# Patient Record
Sex: MALE | Race: WHITE | Employment: OTHER | URBAN - METROPOLITAN AREA
[De-identification: names, ages, dates, MRNs, and addresses within clinical notes are randomized per-mention and may not be internally consistent; named-entity substitution may affect disease eponyms.]

---

## 2017-01-06 ENCOUNTER — APPOINTMENT (OUTPATIENT)
Dept: GENERAL RADIOLOGY | Age: 22
End: 2017-01-06
Attending: EMERGENCY MEDICINE
Payer: OTHER GOVERNMENT

## 2017-01-06 ENCOUNTER — HOSPITAL ENCOUNTER (EMERGENCY)
Age: 22
Discharge: HOME | End: 2017-01-06
Attending: EMERGENCY MEDICINE
Payer: OTHER GOVERNMENT

## 2017-01-06 VITALS
SYSTOLIC BLOOD PRESSURE: 146 MMHG | BODY MASS INDEX: 32.51 KG/M2 | DIASTOLIC BLOOD PRESSURE: 89 MMHG | WEIGHT: 240 LBS | HEIGHT: 72 IN | RESPIRATION RATE: 17 BRPM | OXYGEN SATURATION: 100 % | TEMPERATURE: 97.7 F | HEART RATE: 86 BPM

## 2017-01-06 DIAGNOSIS — M25.572 ACUTE LEFT ANKLE PAIN: ICD-10-CM

## 2017-01-06 DIAGNOSIS — S93.402A SEVERE ANKLE SPRAIN, LEFT, INITIAL ENCOUNTER: Primary | ICD-10-CM

## 2017-01-06 PROCEDURE — 77030036558 HC IMMOB ANK AIR STRRP S2SG-A

## 2017-01-06 PROCEDURE — 99283 EMERGENCY DEPT VISIT LOW MDM: CPT

## 2017-01-06 PROCEDURE — 96372 THER/PROPH/DIAG INJ SC/IM: CPT

## 2017-01-06 PROCEDURE — 73610 X-RAY EXAM OF ANKLE: CPT

## 2017-01-06 PROCEDURE — 74011250636 HC RX REV CODE- 250/636: Performed by: EMERGENCY MEDICINE

## 2017-01-06 RX ORDER — IBUPROFEN 800 MG/1
800 TABLET ORAL
Qty: 16 TAB | Refills: 0 | Status: SHIPPED | OUTPATIENT
Start: 2017-01-06 | End: 2017-01-13

## 2017-01-06 RX ORDER — MORPHINE SULFATE 4 MG/ML
4 INJECTION, SOLUTION INTRAMUSCULAR; INTRAVENOUS
Status: COMPLETED | OUTPATIENT
Start: 2017-01-06 | End: 2017-01-06

## 2017-01-06 RX ORDER — TRAMADOL HYDROCHLORIDE 50 MG/1
50 TABLET ORAL
Qty: 16 TAB | Refills: 0 | Status: SHIPPED | OUTPATIENT
Start: 2017-01-06

## 2017-01-06 RX ADMIN — Medication 4 MG: at 18:14

## 2017-01-06 NOTE — ED PROVIDER NOTES
HPI Comments: 5:50PM Janeth Jenkins is a 24year old male with no pertinent medical hx who presents to ED c/o R ankle pan. Pt explains he rolled his left ankle while stepping off of the curb. Denies any other trauma or injury. No further complaints at this time. The history is provided by the patient. History reviewed. No pertinent past medical history. History reviewed. No pertinent past surgical history. History reviewed. No pertinent family history. Social History     Social History    Marital status:      Spouse name: N/A    Number of children: N/A    Years of education: N/A     Occupational History    Not on file. Social History Main Topics    Smoking status: Current Every Day Smoker    Smokeless tobacco: Not on file    Alcohol use Not on file    Drug use: Not on file    Sexual activity: Not on file     Other Topics Concern    Not on file     Social History Narrative    No narrative on file         ALLERGIES: Penicillins    Review of Systems   Constitutional: Negative for fever. HENT: Negative for sore throat. Eyes: Negative for redness and visual disturbance. Respiratory: Negative for shortness of breath and wheezing. Cardiovascular: Negative for chest pain. Gastrointestinal: Negative for abdominal pain and nausea. Endocrine: Negative for polyuria. Genitourinary: Negative for dysuria. Musculoskeletal: Positive for arthralgias. Negative for neck stiffness. Skin: Negative for rash. Neurological: Negative for headaches. All other systems reviewed and are negative. Vitals:    01/06/17 1759   BP: 146/89   Pulse: 86   Resp: 17   Temp: 97.7 °F (36.5 °C)   SpO2: 100%   Weight: 108.9 kg (240 lb)   Height: 6' (1.829 m)            Physical Exam   Constitutional: He is oriented to person, place, and time. He appears well-developed and well-nourished. No distress. HENT:   Head: Normocephalic and atraumatic.    Eyes: Conjunctivae and EOM are normal. Neck: Normal range of motion. Cardiovascular: Intact distal pulses. Capillary refill < 3 seconds   Pulmonary/Chest: Effort normal. No respiratory distress. Musculoskeletal: Normal range of motion. He exhibits tenderness. Left lower extremity:  Negative Valdez test  Good dorsalis pedal pulses  No hip, tib/fib or knee tenderness  Tenderness to distal and superior lateral malleolus with significant swelling   No foot tenderness  Cant bear weight on left foot   Neurological: He is alert and oriented to person, place, and time. He has normal reflexes. No cranial nerve deficit. He exhibits normal muscle tone. Sensation intact     Skin: Skin is warm and dry. He is not diaphoretic. No pallor. Psychiatric: His behavior is normal.   Nursing note and vitals reviewed. MDM  Number of Diagnoses or Management Options  Acute left ankle pain:   Severe ankle sprain, left, initial encounter:   Diagnosis management comments: ddx fracture, dislocation, sprain, contusion, other structural injury    Xray  Analgesia    Aircast, crutches    I have reassessed the patient. I have discussed the workup, results and plan with the patient and patient is in agreement. Patient has no new complaint. Patient will be prescribed motrin, tramadol. Patient was discharge in stable condition. Patient was given outpatient follow up. Patient is to return to emergency department if any new or worsening condition. Amount and/or Complexity of Data Reviewed  Tests in the radiology section of CPT®: ordered and reviewed  Tests in the medicine section of CPT®: ordered and reviewed    Patient Progress  Patient progress: stable    ED Course       Procedures           PROGRESS NOTES  6:01 PM: Sharon Mederos DO arrives to the bedside to evaluate the patient. Answered the patient's questions regarding the treatment plan.       CONSULTATIONS        ED PHYSICIAN ORDERS  Orders Placed This Encounter    APPLY AIRCAST SPLINT     Left ankle Standing Status:   Standing     Number of Occurrences:   1    XR ANKLE LT MIN 3 V     Standing Status:   Standing     Number of Occurrences:   1     Order Specific Question:   Transport     Answer:   Wheelchair [7]     Order Specific Question:   Reason for Exam     Answer:   pain; fall    CRUTCHES WITH INSTRUCTIONS     Standing Status:   Standing     Number of Occurrences:   1    APPLY ICE TO SPECIFIED AREA     Standing Status:   Standing     Number of Occurrences:   1    morphine injection 4 mg    ibuprofen (MOTRIN) 800 mg tablet     Sig: Take 1 Tab by mouth every six (6) hours as needed for Pain for up to 7 days. Take with food     Dispense:  16 Tab     Refill:  0    traMADol (ULTRAM) 50 mg tablet     Sig: Take 1 Tab by mouth every six (6) hours as needed for Pain. Max Daily Amount: 200 mg. Dispense:  16 Tab     Refill:  0         MEDICATIONS ORDERED  Medications   morphine injection 4 mg (4 mg IntraMUSCular Given 1/6/17 1814)         RADIOLOGY INTERPRETATIONS  XR ANKLE LT MIN 3 V    Soft tissue swelling; No fracture or dislocation; results interpreted by Dr. Carol Poe. 6:33PM, 1/6/2017         ED DIAGNOSIS & DISPOSITION INFORMATION  Diagnosis:   1. Severe ankle sprain, left, initial encounter    2. Acute left ankle pain            Disposition: Discharge    Follow-up Information     Follow up With Details Comments [address], [place] Schedule an appointment as soon as possible for a visit in 1 day As needed, If symptoms worsen [address] [place] [id number] [phone]              Discharge Medication List as of 1/6/2017  6:43 PM      START taking these medications    Details   ibuprofen (MOTRIN) 800 mg tablet Take 1 Tab by mouth every six (6) hours as needed for Pain for up to 7 days. Take with food, Print, Disp-16 Tab, R-0      traMADol (ULTRAM) 50 mg tablet Take 1 Tab by mouth every six (6) hours as needed for Pain.  Max Daily Amount: 200 mg., Print, Disp-16 Tab, R-0               SCRIBE ATTESTATION STATEMENT  Documented by: Catherine Ingram scribing for, and in the presence of, Virginie García DO 5:50 PM     PROVIDER ATTESTATION STATEMENT  I personally performed the services described in the documentation, reviewed the documentation, as recorded by the scribe in my presence, and it accurately and completely records my words and actions.   Virginie García DO    Signed by: Dora Arellano, 1/6/2017, 5:50 PM

## 2017-01-06 NOTE — DISCHARGE INSTRUCTIONS
Ankle Sprain: Care Instructions  Your Care Instructions    An ankle sprain can happen when you twist your ankle. The ligaments that support the ankle can get stretched and torn. Often the ankle is swollen and painful. Ankle sprains may take from several weeks to several months to heal. Usually, the more pain and swelling you have, the more severe your ankle sprain is and the longer it will take to heal. You can heal faster and regain strength in your ankle with good home treatment. It is very important to give your ankle time to heal completely, so that you do not easily hurt your ankle again. Follow-up care is a key part of your treatment and safety. Be sure to make and go to all appointments, and call your doctor if you are having problems. It's also a good idea to know your test results and keep a list of the medicines you take. How can you care for yourself at home? · Prop up your foot on pillows as much as possible for the next 3 days. Try to keep your ankle above the level of your heart. This will help reduce the swelling. · Follow your doctor's directions for wearing a splint or elastic bandage. Wrapping the ankle may help reduce or prevent swelling. · Your doctor may give you a splint, a brace, an air stirrup, or another form of ankle support to protect your ankle until it is healed. Wear it as directed while your ankle is healing. Do not remove it unless your doctor tells you to. After your ankle has healed, ask your doctor whether you should wear the brace when you exercise. · Put ice or cold packs on your injured ankle for 10 to 20 minutes at a time. Try to do this every 1 to 2 hours for the next 3 days (when you are awake) or until the swelling goes down. Put a thin cloth between the ice and your skin. · You may need to use crutches until you can walk without pain. If you do use crutches, try to bear some weight on your injured ankle if you can do so without pain.  This helps the ankle heal.  · Take pain medicines exactly as directed. ¨ If the doctor gave you a prescription medicine for pain, take it as prescribed. ¨ If you are not taking a prescription pain medicine, ask your doctor if you can take an over-the-counter medicine. · If you have been given ankle exercises to do at home, do them exactly as instructed. These can promote healing and help prevent lasting weakness. When should you call for help? Call your doctor now or seek immediate medical care if:  · Your pain is getting worse. · Your swelling is getting worse. · Your splint feels too tight or you are unable to loosen it. Watch closely for changes in your health, and be sure to contact your doctor if:  · You are not getting better after 1 week. Where can you learn more? Go to http://frances-jennifer.info/. Enter V993 in the search box to learn more about \"Ankle Sprain: Care Instructions. \"  Current as of: May 23, 2016  Content Version: 11.1  © 1217-3189 [email or web address]. Care instructions adapted under license by [employer] (which disclaims liability or warranty for this information). If you have questions about a medical condition or this instruction, always ask your healthcare professional. [place] [address] any warranty or liability for your use of this information.

## 2017-01-06 NOTE — ED NOTES
I have reviewed discharge instructions with the patient and spouse. The patient and spouse verbalized understanding. Patient armband removed and shredded, scripts x 2 marian, ride with pt,  pt informed not to drive after medication given in ER.